# Patient Record
Sex: MALE | Race: WHITE | Employment: FULL TIME | URBAN - METROPOLITAN AREA
[De-identification: names, ages, dates, MRNs, and addresses within clinical notes are randomized per-mention and may not be internally consistent; named-entity substitution may affect disease eponyms.]

---

## 2022-03-03 ENCOUNTER — OFFICE VISIT (OUTPATIENT)
Dept: URGENT CARE | Facility: URGENT CARE | Age: 27
End: 2022-03-03
Payer: COMMERCIAL

## 2022-03-03 VITALS
RESPIRATION RATE: 15 BRPM | BODY MASS INDEX: 26.48 KG/M2 | OXYGEN SATURATION: 98 % | HEART RATE: 71 BPM | WEIGHT: 185 LBS | TEMPERATURE: 98.4 F | HEIGHT: 70 IN | DIASTOLIC BLOOD PRESSURE: 80 MMHG | SYSTOLIC BLOOD PRESSURE: 122 MMHG

## 2022-03-03 DIAGNOSIS — K27.9 PUD (PEPTIC ULCER DISEASE): Primary | ICD-10-CM

## 2022-03-03 PROCEDURE — 99203 OFFICE O/P NEW LOW 30 MIN: CPT

## 2022-03-03 NOTE — PROGRESS NOTES
Subjective: Patient says that for the last month and a half he has been having intermittent heartburn type pain that comes and goes but last night it got a lot worse and he vomited just a little bit of blood.  He thinks it is possible he has been having some darker stools lately but he is not sure.  He does not feel sick or weak or dizzy.  He does drink alcohol, caffeine, uses a vaporizer or nicotine, does not take aspirin or ibuprofen.  He has never had an ulcer and is far as he knows it does not run in his family.    Objective: Heart is regular without murmurs, abdomen is benign.  Vitals are stable.  He appears quite well    Assessment and plan: This does sound like peptic ulcer problems but I do not think he needs to be seen in the emergency room and have an emergency endoscopy as this will likely come down if he takes about 6 weeks of medication and then tapers off.  He should try to modify his lifestyle factors as much as he can.  Avoid anti-inflammatories.  I discussed what to expect if he started having some active bleeding and in that case he should go straight to the emergency room.  If this never reoccurs he can take the medication for a few days and see if it calms down.  If he gets very similar symptoms again such that he is seeking medical care, he probably should be checked for H. pylori as well.  This may not recur at all however.

## 2024-04-18 ENCOUNTER — OFFICE VISIT (OUTPATIENT)
Dept: URGENT CARE | Facility: URGENT CARE | Age: 29
End: 2024-04-18
Payer: COMMERCIAL

## 2024-04-18 VITALS
SYSTOLIC BLOOD PRESSURE: 117 MMHG | HEART RATE: 91 BPM | RESPIRATION RATE: 15 BRPM | DIASTOLIC BLOOD PRESSURE: 81 MMHG | BODY MASS INDEX: 26.34 KG/M2 | TEMPERATURE: 97.5 F | HEIGHT: 70 IN | WEIGHT: 184 LBS | OXYGEN SATURATION: 98 %

## 2024-04-18 DIAGNOSIS — H60.92 INFLAMMATION OF EAR CANAL, LEFT: ICD-10-CM

## 2024-04-18 DIAGNOSIS — H93.8X2 EAR CONGESTION, LEFT: ICD-10-CM

## 2024-04-18 DIAGNOSIS — H61.22 IMPACTED CERUMEN OF LEFT EAR: Primary | ICD-10-CM

## 2024-04-18 PROCEDURE — 99213 OFFICE O/P EST LOW 20 MIN: CPT | Performed by: FAMILY MEDICINE

## 2024-04-18 RX ORDER — CIPROFLOXACIN AND DEXAMETHASONE 3; 1 MG/ML; MG/ML
4 SUSPENSION/ DROPS AURICULAR (OTIC) 2 TIMES DAILY
Qty: 7.5 ML | Refills: 0 | Status: SHIPPED | OUTPATIENT
Start: 2024-04-18 | End: 2024-04-23

## 2024-04-18 NOTE — PROGRESS NOTES
Assessment & Plan     Impacted cerumen of left ear  Ear congestion, left  Inflammation of ear canal, left  - ciprofloxacin-dexAMETHasone (CIPRODEX) 0.3-0.1 % otic suspension  Dispense: 7.5 mL; Refill: 0       Patient is noted to have left-sided nasal congestion although reports no recent cold or fever.  I did advise that he try nasal steroids on the left side to help with drainage of the eustachian tube that may be accumulating fluid in the back of his left eardrum causing additional pressure.  There is certainly minor redness in the mid to proximal area of his ear canal which I feel a course of a topical steroid combined with an antibiotic may be beneficial.    Using a plastic currette I did disimpact a mild amount of cerumen from the left ear canal to better visualize the affected area.    Serg Villatoro MD   [facility]    Subjective     Lobito is a 28 year old male who presents to clinic today for the following health issues:  Chief Complaint   Patient presents with    Otalgia     X5 days of pain in left ear     Urgent Care     HPI    Reports over the last year or so has been having on and off left sided ear issues which may be related to his occasional swimming.  He has received fluocinolone acetonide from his primary care office in the past which has helped. Known to have wax accumulation in his ears. No noted bloody discharge. Hearing is mostly preserved without tinnitus. No pain of the mastoids noted. No thick nasal drainage    There are no problems to display for this patient.      Current Outpatient Medications   Medication Sig Dispense Refill    ciprofloxacin-dexAMETHasone (CIPRODEX) 0.3-0.1 % otic suspension Place 4 drops Into the left ear 2 times daily for 5 days 7.5 mL 0    omeprazole (PRILOSEC) 20 MG DR capsule Take 1 capsule (20 mg) by mouth 2 times daily (Patient not taking: Reported on 4/18/2024) 60 capsule 1     No current facility-administered medications for this visit.          "  Objective    /81   Pulse 91   Temp 97.5  F (36.4  C) (Temporal)   Resp 15   Ht 1.778 m (5' 10\")   Wt 83.5 kg (184 lb)   SpO2 98%   BMI 26.40 kg/m    Physical Exam         There is mild cerumen impaction bilaterally.  After disimpaction of the left proximal canal there is inflammation at the base of the ear canal.  There is no bulging the left tympanic membrane but there is suggestion of possible cloudy fluid behind the left tympanic membrane without any associated erythema.    No results found for any visits on 04/18/24.                  The use of Dragon/VenJuvo dictation services may have been used to construct the content in this note; any grammatical or spelling errors are non-intentional. Please contact the author of this note directly if you are in need of any clarification.   "

## 2024-04-18 NOTE — PATIENT INSTRUCTIONS
Nasal steroid use once daily in each nostril  Fluticasone, mometasone or triamcinolone or generic medications available over-the-counter        If your insurance does not cover the Ciprodex medication well I did give the pharmacist permission to substitute for a generic equivalent please work with your pharmacy to find one that works for you      If symptoms have not improved in the next few days please return to be evaluated

## 2024-09-03 ENCOUNTER — OFFICE VISIT (OUTPATIENT)
Dept: URGENT CARE | Facility: URGENT CARE | Age: 29
End: 2024-09-03
Payer: COMMERCIAL

## 2024-09-03 VITALS
HEART RATE: 97 BPM | HEIGHT: 70 IN | BODY MASS INDEX: 25.48 KG/M2 | OXYGEN SATURATION: 95 % | RESPIRATION RATE: 17 BRPM | SYSTOLIC BLOOD PRESSURE: 133 MMHG | WEIGHT: 178 LBS | DIASTOLIC BLOOD PRESSURE: 76 MMHG

## 2024-09-03 DIAGNOSIS — J06.9 VIRAL URI: ICD-10-CM

## 2024-09-03 DIAGNOSIS — R50.9 FEVER IN ADULT: Primary | ICD-10-CM

## 2024-09-03 LAB
BASOPHILS # BLD AUTO: 0 10E3/UL (ref 0–0.2)
BASOPHILS NFR BLD AUTO: 1 %
EOSINOPHIL # BLD AUTO: 0.2 10E3/UL (ref 0–0.7)
EOSINOPHIL NFR BLD AUTO: 3 %
ERYTHROCYTE [DISTWIDTH] IN BLOOD BY AUTOMATED COUNT: 12 % (ref 10–15)
HCT VFR BLD AUTO: 43.3 % (ref 40–53)
HGB BLD-MCNC: 14.8 G/DL (ref 13.3–17.7)
IMM GRANULOCYTES # BLD: 0 10E3/UL
IMM GRANULOCYTES NFR BLD: 0 %
LYMPHOCYTES # BLD AUTO: 1.2 10E3/UL (ref 0.8–5.3)
LYMPHOCYTES NFR BLD AUTO: 18 %
MCH RBC QN AUTO: 27.7 PG (ref 26.5–33)
MCHC RBC AUTO-ENTMCNC: 34.2 G/DL (ref 31.5–36.5)
MCV RBC AUTO: 81 FL (ref 78–100)
MONOCYTES # BLD AUTO: 0.7 10E3/UL (ref 0–1.3)
MONOCYTES NFR BLD AUTO: 11 %
NEUTROPHILS # BLD AUTO: 4.5 10E3/UL (ref 1.6–8.3)
NEUTROPHILS NFR BLD AUTO: 68 %
PLATELET # BLD AUTO: 207 10E3/UL (ref 150–450)
RBC # BLD AUTO: 5.34 10E6/UL (ref 4.4–5.9)
WBC # BLD AUTO: 6.6 10E3/UL (ref 4–11)

## 2024-09-03 PROCEDURE — 99214 OFFICE O/P EST MOD 30 MIN: CPT | Performed by: NURSE PRACTITIONER

## 2024-09-03 PROCEDURE — 36415 COLL VENOUS BLD VENIPUNCTURE: CPT | Performed by: NURSE PRACTITIONER

## 2024-09-03 PROCEDURE — 85025 COMPLETE CBC W/AUTO DIFF WBC: CPT | Performed by: NURSE PRACTITIONER

## 2024-09-03 NOTE — PROGRESS NOTES
"Chief Complaint   Patient presents with    Fever     X5 days of fever     Urgent Care     Negative at home covid tests     Cough     Productive cough, chest congestion     Headache    Nausea     First two days was nauseas but has since subsided      SUBJECTIVE:  Jose Moralez is a 29 year old male presenting with fever cough congestion headache nausea vomiting myalgias trouble sleeping yellow purulent mucus over the last 5 days.  He is worried about the fevers as he has his engagement party upcoming in 4 days.  Had a 99.9 temp in the last day.  Subtle midsternal chest pain during cough.  Declines severe shortness of breath pleurisy hemoptysis.  Smoking history.  No known sick exposures.  Had negative COVID test.    No past medical history on file.  Current Outpatient Medications   Medication Sig Dispense Refill    omeprazole (PRILOSEC) 20 MG DR capsule Take 1 capsule (20 mg) by mouth 2 times daily (Patient not taking: Reported on 4/18/2024) 60 capsule 1     No current facility-administered medications for this visit.     Social History     Tobacco Use    Smoking status: Never    Smokeless tobacco: Never   Substance Use Topics    Alcohol use: Not on file     No Known Allergies    Review of Systems  All systems negative except for those listed above in HPI.    OBJECTIVE:   /76   Pulse 97   Resp 17   Ht 1.778 m (5' 10\")   Wt 80.7 kg (178 lb)   SpO2 95%   BMI 25.54 kg/m      Physical Exam  Vitals reviewed.   Constitutional:       Appearance: Normal appearance.   HENT:      Head: Normocephalic and atraumatic.      Nose: Nose normal.      Mouth/Throat:      Mouth: Mucous membranes are moist.      Pharynx: Oropharynx is clear.   Eyes:      Extraocular Movements: Extraocular movements intact.      Conjunctiva/sclera: Conjunctivae normal.      Pupils: Pupils are equal, round, and reactive to light.   Cardiovascular:      Rate and Rhythm: Normal rate.      Pulses: Normal pulses.   Pulmonary:      Effort: " Pulmonary effort is normal.   Musculoskeletal:         General: Normal range of motion.      Cervical back: Normal range of motion and neck supple.   Skin:     General: Skin is warm and dry.      Findings: No rash.   Neurological:      General: No focal deficit present.      Mental Status: He is alert and oriented to person, place, and time.   Psychiatric:         Mood and Affect: Mood normal.         Behavior: Behavior normal.       Results for orders placed or performed in visit on 09/03/24   CBC with platelets and differential     Status: None   Result Value Ref Range    WBC Count 6.6 4.0 - 11.0 10e3/uL    RBC Count 5.34 4.40 - 5.90 10e6/uL    Hemoglobin 14.8 13.3 - 17.7 g/dL    Hematocrit 43.3 40.0 - 53.0 %    MCV 81 78 - 100 fL    MCH 27.7 26.5 - 33.0 pg    MCHC 34.2 31.5 - 36.5 g/dL    RDW 12.0 10.0 - 15.0 %    Platelet Count 207 150 - 450 10e3/uL    % Neutrophils 68 %    % Lymphocytes 18 %    % Monocytes 11 %    % Eosinophils 3 %    % Basophils 1 %    % Immature Granulocytes 0 %    Absolute Neutrophils 4.5 1.6 - 8.3 10e3/uL    Absolute Lymphocytes 1.2 0.8 - 5.3 10e3/uL    Absolute Monocytes 0.7 0.0 - 1.3 10e3/uL    Absolute Eosinophils 0.2 0.0 - 0.7 10e3/uL    Absolute Basophils 0.0 0.0 - 0.2 10e3/uL    Absolute Immature Granulocytes 0.0 <=0.4 10e3/uL   CBC with platelets and differential     Status: None    Narrative    The following orders were created for panel order CBC with platelets and differential.  Procedure                               Abnormality         Status                     ---------                               -----------         ------                     CBC with platelets and d...[[id number]]                      Final result                 Please view results for these tests on the individual orders.     ASSESSMENT:    ICD-10-CM    1. Fever in adult  R50.9 CBC with platelets and differential     CBC with platelets and differential      2. Viral URI  J06.9         PLAN:     Viral  syndrome  CBC without leukocytosis bacterial shift  Reassurance given that typically most contagious in the first 5 days and until fevers have been broken for 24 hours  He should be in the clear for his engagement party in the next 4 days  Rest fluids OTC Tylenol ibuprofen Mucinex Cepacol tea with honey soup  Reevaluation if new or worsening symptoms    Follow up with primary care provider with any problems, questions or concerns or if symptoms worsen or fail to improve. Patient agreed to plan and verbalized understanding.    Cass Trammell, YOAVP-BC  [facility] [place]

## 2025-03-30 ENCOUNTER — OFFICE VISIT (OUTPATIENT)
Dept: URGENT CARE | Facility: URGENT CARE | Age: 30
End: 2025-03-30
Payer: COMMERCIAL

## 2025-03-30 VITALS
TEMPERATURE: 97.5 F | OXYGEN SATURATION: 99 % | DIASTOLIC BLOOD PRESSURE: 79 MMHG | WEIGHT: 180 LBS | RESPIRATION RATE: 12 BRPM | HEART RATE: 58 BPM | SYSTOLIC BLOOD PRESSURE: 129 MMHG | BODY MASS INDEX: 25.83 KG/M2

## 2025-03-30 DIAGNOSIS — L25.9 CONTACT DERMATITIS, UNSPECIFIED CONTACT DERMATITIS TYPE, UNSPECIFIED TRIGGER: Primary | ICD-10-CM

## 2025-03-30 PROCEDURE — 3074F SYST BP LT 130 MM HG: CPT

## 2025-03-30 PROCEDURE — 3078F DIAST BP <80 MM HG: CPT

## 2025-03-30 PROCEDURE — 99213 OFFICE O/P EST LOW 20 MIN: CPT

## 2025-03-30 PROCEDURE — 1125F AMNT PAIN NOTED PAIN PRSNT: CPT

## 2025-03-30 RX ORDER — TRIAMCINOLONE ACETONIDE 1 MG/G
OINTMENT TOPICAL 2 TIMES DAILY
Qty: 80 G | Refills: 1 | Status: SHIPPED | OUTPATIENT
Start: 2025-03-30

## 2025-03-30 ASSESSMENT — PAIN SCALES - GENERAL: PAINLEVEL_OUTOF10: MILD PAIN (3)

## 2025-03-30 NOTE — PROGRESS NOTES
Urgent Care Clinic Visit    Chief Complaint   Patient presents with    Urgent Care    Derm Problem     On and off for 2 months. Did patch test 1 month ago  hand Localized to right hand. Redness, swelling, hives, extreme itching, burning. Icing has helped with symptoms                    Add medication:   -clobetasol propionate 0.05%  -Fluconisone    Statement Selected
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
## 2025-04-06 ENCOUNTER — HEALTH MAINTENANCE LETTER (OUTPATIENT)
Age: 30
End: 2025-04-06